# Patient Record
Sex: FEMALE | Race: BLACK OR AFRICAN AMERICAN | NOT HISPANIC OR LATINO | Employment: FULL TIME | URBAN - METROPOLITAN AREA
[De-identification: names, ages, dates, MRNs, and addresses within clinical notes are randomized per-mention and may not be internally consistent; named-entity substitution may affect disease eponyms.]

---

## 2018-01-17 ENCOUNTER — TRANSCRIBE ORDERS (OUTPATIENT)
Dept: PHYSICAL THERAPY | Facility: CLINIC | Age: 48
End: 2018-01-17

## 2018-01-17 DIAGNOSIS — S46.911A STRAIN OF RIGHT SHOULDER, INITIAL ENCOUNTER: Primary | ICD-10-CM

## 2018-01-22 ENCOUNTER — TREATMENT (OUTPATIENT)
Dept: PHYSICAL THERAPY | Facility: CLINIC | Age: 48
End: 2018-01-22

## 2018-01-22 DIAGNOSIS — M25.511 ACUTE PAIN OF RIGHT SHOULDER: Primary | ICD-10-CM

## 2018-01-22 DIAGNOSIS — S46.911A STRAIN OF RIGHT SHOULDER, INITIAL ENCOUNTER: ICD-10-CM

## 2018-01-22 DIAGNOSIS — S46.211A BICEPS STRAIN, RIGHT, INITIAL ENCOUNTER: ICD-10-CM

## 2018-01-22 PROCEDURE — 97001 PR PHYS THERAPY EVALUATION: CPT | Performed by: PHYSICAL THERAPIST

## 2018-01-22 PROCEDURE — 97530 THERAPEUTIC ACTIVITIES: CPT | Performed by: PHYSICAL THERAPIST

## 2018-01-22 PROCEDURE — 97110 THERAPEUTIC EXERCISES: CPT | Performed by: PHYSICAL THERAPIST

## 2018-01-22 PROCEDURE — 97014 ELECTRIC STIMULATION THERAPY: CPT | Performed by: PHYSICAL THERAPIST

## 2018-01-22 NOTE — PROGRESS NOTES
"Physical Therapy Initial Evaluation and Plan of Care    TIME IN 1433 TIME OUT 1553  Patient: Chastity Devine   : 1970  Diagnosis/ICD-10 Code:  Acute pain of right shoulder [M25.511]  Referring practitioner: ANDRES Goode    Subjective Evaluation    History of Present Illness  Date of onset: 2017  Mechanism of injury: Moving large tub/container with metal guard around it; with specific movement patient felt and heard sound of paper ripping in (R) shoulder.  Reported injury.  Worked x 2 days and approx 2 days later onset of pain (R) anterior shoulder. Pain worsened to the point she could not tolerate it and patient presented to [facility].  Given 800 mg Ibuprofen and put on work restrictions (#15-25) but patient was able to work normal job. F/u 17 given steroid pack and Flexeril; has 2 pills left of steroid pack.  Restrictions same and patient has continued working.    Denies prior (R) shoulder injury. Sleeping ok and denies numbness or tingling.      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[employer];    Precautions and Work Restrictions: see scanned restriction sheetPain  Current pain ratin  At best pain ratin  At worst pain rating: 10  Location: (R) anterior shoulder  Quality: tight and throbbing (\"heavy\")  Relieving factors: medications, heat and ice (hot shower)  Aggravating factors: outstretched reach, lifting, movement and overhead activity (dressing activities (shirt and bra on/off), picking up grandbaby, certain specific positions/movements, reaching behind back (pulling), weightbearing through (R) UE - sometimes, cooking activities)  Progression: improved (\"it feels better than it did\")    Hand dominance: right    Diagnostic Tests  No diagnostic tests performed    Treatments  Current treatment: medication and physical therapy  Current treatment comments: work activity restrictions/modifications.     Patient Goals  Patient goals for therapy: decreased pain  Patient goal: " "\"get my arm back to working and get me back to work\"           Objective       Active Range of Motion   Left Shoulder   Flexion: 163 degrees   Extension: 55 degrees   Abduction: 161 degrees   External rotation 90°: 87 degrees   Internal rotation 90°: 62 degrees     Right Shoulder   Flexion: 145 (\"tight, pulling\") degrees   Extension: 35 (\"pull\") degrees   Abduction: 135 (\"tight, pull\") degrees   External rotation 90°: 80 degreeswith pain  Internal rotation 90°: 45 (\"tight\") degrees     Left Elbow   Flexion: 145 degrees   Extension: 0 degrees     Right Elbow   Flexion: 140 degrees   Extension: 0 degrees     Strength/Myotome Testing     Left Shoulder     Planes of Motion   Flexion: 5   Extension: 5   Abduction: 5   External rotation at 0°: 5   Internal rotation at 0°: 5     Right Shoulder     Planes of Motion   Right shoulder forward flexion strength: 5-/5.   Extension: 5   Right shoulder abduction strength: 5-/5.   Right shoulder external rotation strength at 0 degrees: 5-/5.   Internal rotation at 0°: 4+ (mild pain)     Left Elbow   Flexion: 5  Extension: 5    Right Elbow   Flexion strength: 5-/5.  Extension strength: 5-/5.    Tests     Right Shoulder   Positive active compression ([place]), empty can and Speed's.   Negative drop arm, external rotation lag sign and lift-off.          Assessment & Plan     Assessment  Impairments: abnormal or restricted ROM, activity intolerance, impaired physical strength, lacks appropriate home exercise program and pain with function  Assessment details: Patient is a 47 y.o female who reports onset of (R) anterior shoulder pain at work on 12/20/17 when she was moving a large tub/container and felt/heard a \"ripping sound\" (R) anterior shoulder.  She rates pain 1-10/10 and is worse with reaching, lifting, AROM, overhead activities, dressing activities, reaching bhind back, cooking, and picking up her grandbaby.  She exhibits limited (R) shoulder AROM and strength.  Special testing is " positive indicating the possibility of internal derangement.  She may benefit from skilled PT services.  Prognosis: good  Functional Limitations: carrying objects, lifting, pulling, pushing, uncomfortable because of pain, reaching behind back, reaching overhead and unable to perform repetitive tasks  Goals  Plan Goals: STGs: to be met in 2 weeks  1. Patient will be independent with initial HEP  2. Patient will report improved (R) shoulder s/s 0-4/10  3. Patient will demonstrate (R) shoulder AROM WFL all planes, painfree  4. Patient will exhibit negative special testing (R) shoulder  5. Patient will report 50% improved ability to perform ADLs, dressing, and cooking activities    LTGs: to be met in 4 weeks  1. Patient will be independent with progressed strength and stabilization HEP  2. Patient will report improved (R) shoulder s/s 0-1/10  3. Patient will demonstrate improved (R) shoulder MMT grossly 5/5  4. Patient will perform ADLs at near prior ability level, largely painfree  5. Patient will perform simulated work tasks at level sufficient to demonstrate readiness to RTW without restrictions    Plan  Therapy options: will be seen for skilled physical therapy services  Planned modality interventions: cryotherapy, electrical stimulation/Russian stimulation, thermotherapy (hydrocollator packs) and ultrasound  Planned therapy interventions: fine motor coordination training, flexibility, functional ROM exercises, home exercise program, joint mobilization, manual therapy, neuromuscular re-education, motor coordination training, soft tissue mobilization, strengthening, stretching and therapeutic activities  Other planned therapy interventions: simulated work tasks  Frequency: 3x week  Duration in weeks: 4  Treatment plan discussed with: patient        Manual Therapy:         mins  94337;  Therapeutic Exercise:    21     mins  69429;     Neuromuscular Maximus:        mins  93053;    Therapeutic Activity:     15     mins   20553;     Gait Training:           mins  26612;     Ultrasound:          mins  57068;    Electrical Stimulation:    15     mins  17409 ( );  Dry Needling          mins self-pay    Timed Treatment:   36   mins   Total Treatment:     80   mins    PT SIGNATURE: Lydia Harrell PT, DPT   DATE TREATMENT INITIATED: 1/22/2018    Initial Certification  Certification Period: 4/22/2018  I certify that the therapy services are furnished while this patient is under my care.  The services outlined above are required by this patient, and will be reviewed every 90 days.     PHYSICIAN: ANDRES Goode      DATE:     Please sign and return via fax to [phone].. Thank you, [facility].

## 2018-01-26 ENCOUNTER — OFFICE VISIT (OUTPATIENT)
Dept: PHYSICAL THERAPY | Facility: CLINIC | Age: 48
End: 2018-01-26

## 2018-01-26 DIAGNOSIS — M25.511 ACUTE PAIN OF RIGHT SHOULDER: Primary | ICD-10-CM

## 2018-01-26 PROCEDURE — 97110 THERAPEUTIC EXERCISES: CPT | Performed by: PHYSICAL THERAPIST

## 2018-01-26 PROCEDURE — 97014 ELECTRIC STIMULATION THERAPY: CPT | Performed by: PHYSICAL THERAPIST

## 2018-01-26 PROCEDURE — 97530 THERAPEUTIC ACTIVITIES: CPT | Performed by: PHYSICAL THERAPIST

## 2018-01-26 NOTE — PROGRESS NOTES
Physical Therapy Daily Progress Note    Time In 1450  Time Out 1530    Yeedenitamadeline Devine reports: some relief noted last visit post application of ice and electrical stimulation.  Stomach is bothering me today states patient.    Subjective     Objective       Palpation     Right Tenderness of the pectoralis major and pectoralis minor.     Tenderness     Right Shoulder  Tenderness in the biceps tendon (proximal) and bicipital groove.      See Exercise, Manual, and Modality Logs for complete treatment.   Pain free exercise performance and progression.  Added cane assisted AROM abd and ER as well as pain free cervical rot and lat flex B and post sh rolls.      Assessment/Plan  Rehab efforts limited today by subjective reports of stomach pain/distress.  Able to progress AAROM exercises right shoulder without increased symptoms.  Modalities and cryotherapy applications are beneficial per patient report.  Palpable tenderness present along anterior right shoulder(biceps origin/bicepital groove) and pec musculature.    Progress strengthening /stabilization /functional activity as symptoms right shoulder allow           Manual Therapy:         mins  95315;  Therapeutic Exercise:     10    mins  74814;     Neuromuscular Maximus:       mins  13661;    Therapeutic Activity:       10   mins  49996;     Gait Training:           mins  33553;     Ultrasound:          mins  48660;    Electrical Stimulation:    15     mins  41650 ( );      Timed Treatment:   35   mins   Total Treatment:     40   mins    Dani Haro PTA    Physical Therapist Assistant [place] [id number]

## 2018-01-29 ENCOUNTER — TREATMENT (OUTPATIENT)
Dept: PHYSICAL THERAPY | Facility: CLINIC | Age: 48
End: 2018-01-29

## 2018-01-29 DIAGNOSIS — S46.211D BICEPS STRAIN, RIGHT, SUBSEQUENT ENCOUNTER: ICD-10-CM

## 2018-01-29 DIAGNOSIS — M25.511 ACUTE PAIN OF RIGHT SHOULDER: Primary | ICD-10-CM

## 2018-01-29 DIAGNOSIS — S46.911D STRAIN OF RIGHT SHOULDER, SUBSEQUENT ENCOUNTER: ICD-10-CM

## 2018-01-29 PROCEDURE — 97110 THERAPEUTIC EXERCISES: CPT | Performed by: PHYSICAL THERAPIST

## 2018-01-29 PROCEDURE — 97530 THERAPEUTIC ACTIVITIES: CPT | Performed by: PHYSICAL THERAPIST

## 2018-01-29 PROCEDURE — 97014 ELECTRIC STIMULATION THERAPY: CPT | Performed by: PHYSICAL THERAPIST

## 2018-01-29 NOTE — PROGRESS NOTES
"Physical Therapy Daily Progress Note    Time In 1435  Time Out 1526    Chastity Devine reports: \"I'm feeling a little better each time. I'm not in as much pain as last visit or my first visit. I had the CT of my stomach and everything looks good.\"    Subjective     Objective   See Exercise, Manual, and Modality Logs for complete treatment.   *Added submax shld isometrics 5-way and scapular retractions  *Initiated UBE    Assessment & Plan     Assessment  Assessment details: Patient reports steadily improving (R) UE s/s and improving ability to perform and tolerate ADLs.  She is able to initiate UBE forward and retro this date as well as scapular retractions and submax shoulder isometrics without increased pain.  She exhibits improving functional use of (R) UE within PT clinic.  She responds positively to modality application.        Progress per Plan of Care         Manual Therapy:         mins  98153;  Therapeutic Exercise:    22     mins  48259;     Neuromuscular Maximus:        mins  54659;    Therapeutic Activity:     14     mins  00337;     Gait Training:           mins  56701;     Ultrasound:          mins  82741;    Electrical Stimulation:    15     mins  03651 ( );  Dry Needling          mins self-pay    Timed Treatment:   36   mins   Total Treatment:     51   mins    Lydia Harrell PT, DPT  Physical Therapist  [place] License # [id number]    "

## 2018-01-31 ENCOUNTER — TREATMENT (OUTPATIENT)
Dept: PHYSICAL THERAPY | Facility: CLINIC | Age: 48
End: 2018-01-31

## 2018-01-31 DIAGNOSIS — S46.911D STRAIN OF RIGHT SHOULDER, SUBSEQUENT ENCOUNTER: ICD-10-CM

## 2018-01-31 DIAGNOSIS — S46.211D BICEPS STRAIN, RIGHT, SUBSEQUENT ENCOUNTER: ICD-10-CM

## 2018-01-31 DIAGNOSIS — M25.511 ACUTE PAIN OF RIGHT SHOULDER: Primary | ICD-10-CM

## 2018-01-31 PROCEDURE — 97014 ELECTRIC STIMULATION THERAPY: CPT | Performed by: PHYSICAL THERAPIST

## 2018-01-31 PROCEDURE — 97530 THERAPEUTIC ACTIVITIES: CPT | Performed by: PHYSICAL THERAPIST

## 2018-01-31 NOTE — PROGRESS NOTES
"Physical Therapy Progress Note    Time In 1432  Time Out 1521      2018  ANDRES Goode    Re: Chastity Devine  ________________________________________________________________    Ms. Chastity Devine, has attended 4 PT sessions.  Treatment has consisted of: patient education, therapeutic activity, therapeutic exercise, and modalities.    S: Ms. Chastity Devine states: \"My shoulder is feeling mostly better but still does that weird thing that sounds like a joseph doll leg bending/clicking.  It never did that before this injury. I am sleeping ok.  It is uncomfortable reaching behind my back trying to get my bra undone.\"    Subjective Evaluation    Pain  Current pain ratin  At worst pain ratin  Location: (R) anterior shoulder           Objective       Tenderness     Additional Tenderness Details  Min (+) TTP (R) anterior shoulder    Active Range of Motion     Right Shoulder   Flexion: 168 degrees   Extension: 57 degrees   Abduction: 159 (\"click\" within shoulder) degrees   External rotation 90°: 91 degrees  Internal rotation 90°: 59 (\"pulling\") degrees     Right Elbow   Flexion: 142 degrees   Extension: 0 degrees     Strength/Myotome Testing     Right Shoulder     Planes of Motion   Right shoulder forward flexion strength: 5-/5 with mild pain ant shoulder.   Extension: 5   Abduction: 5   Right shoulder external rotation strength at 0 degrees: 5-/5.   Right shoulder internal rotation strength at 0 degrees: 5-/5.     Right Elbow   Flexion: 5  Extension strength: 5-/5.    Tests     Right Shoulder   Positive active compression ([place]) and Speed's.   Negative empty can.      See Exercise, Manual, and Modality Logs for complete treatment.       Assessment & Plan     Assessment  Assessment details: Patient verbalizes significant improvements in (R) shoulder s/s and functional use.  She demonstrates (R) shoulder AROM WFL and mild deficits in flexion, internal, and rotation (flexion with mild pain).  " "Tenderness persists at (R) anterior shoulder, and patient continues to c/o popping/clicking feeling within her (R) shoulder with certain activities and movements but not always predictable patterns.  Special testing is mildly positive in Speeds and Victor's tests.  She reports \"99.9%\" improvement overall; she may benefit from RTW vs one additional week of PT to progress strengthening for decreased s/s of tendonitis.        Other - patient to see Zachary Lundberg PA-C for follow-up appointment after PT this date.         Manual Therapy:         mins  56230;  Therapeutic Exercise:         mins  19430;     Neuromuscular Maximus:        mins  05227;    Therapeutic Activity:     34     mins  17160;     Gait Training:           mins  23999;     Ultrasound:          mins  82907;    Electrical Stimulation:    15     mins  79355 ( );  Dry Needling          mins self-pay    Timed Treatment:   34   mins   Total Treatment:     49   mins    Lydia Harrell PT, DPT  Physical Therapist  [place] License # [id number]  "

## 2025-03-17 ENCOUNTER — OFFICE VISIT (OUTPATIENT)
Dept: SPORTS MEDICINE | Facility: CLINIC | Age: 55
End: 2025-03-17
Payer: OTHER MISCELLANEOUS

## 2025-03-17 VITALS
BODY MASS INDEX: 28.89 KG/M2 | WEIGHT: 157 LBS | RESPIRATION RATE: 20 BRPM | HEIGHT: 62 IN | HEART RATE: 95 BPM | TEMPERATURE: 97.9 F | OXYGEN SATURATION: 97 %

## 2025-03-17 DIAGNOSIS — S46.211A BICEPS MUSCLE STRAIN, RIGHT, INITIAL ENCOUNTER: ICD-10-CM

## 2025-03-17 DIAGNOSIS — M25.511 ACUTE PAIN OF RIGHT SHOULDER: Primary | ICD-10-CM

## 2025-03-17 DIAGNOSIS — R29.898 WEAKNESS OF RIGHT SHOULDER: ICD-10-CM

## 2025-03-17 PROCEDURE — 99243 OFF/OP CNSLTJ NEW/EST LOW 30: CPT | Performed by: STUDENT IN AN ORGANIZED HEALTH CARE EDUCATION/TRAINING PROGRAM

## 2025-03-17 RX ORDER — OLOPATADINE HYDROCHLORIDE 1 MG/ML
1 SOLUTION/ DROPS OPHTHALMIC
COMMUNITY
Start: 2025-03-16 | End: 2026-03-16

## 2025-03-17 RX ORDER — VERAPAMIL HCL 100 %
POWDER (GRAM) MISCELLANEOUS
COMMUNITY
Start: 2024-12-06

## 2025-03-17 RX ORDER — METHOCARBAMOL 750 MG/1
TABLET, FILM COATED ORAL
COMMUNITY
Start: 2025-03-03 | End: 2025-03-31 | Stop reason: ALTCHOICE

## 2025-03-17 RX ORDER — CYCLOBENZAPRINE HCL 10 MG
TABLET ORAL
COMMUNITY
Start: 2025-03-03 | End: 2025-03-31 | Stop reason: ALTCHOICE

## 2025-03-17 RX ORDER — AMLODIPINE BESYLATE 5 MG/1
TABLET ORAL
COMMUNITY
Start: 2025-01-15

## 2025-03-17 RX ORDER — TRIAMTERENE AND HYDROCHLOROTHIAZIDE 37.5; 25 MG/1; MG/1
CAPSULE ORAL
COMMUNITY
Start: 2025-01-15

## 2025-03-17 RX ORDER — MECLIZINE HYDROCHLORIDE 25 MG/1
TABLET ORAL
COMMUNITY
Start: 2025-01-24

## 2025-03-17 RX ORDER — SUMATRIPTAN SUCCINATE 25 MG/1
TABLET ORAL
COMMUNITY
Start: 2024-10-24

## 2025-03-17 RX ORDER — ERGOCALCIFEROL 1.25 MG/1
CAPSULE, LIQUID FILLED ORAL
COMMUNITY
Start: 2025-01-20

## 2025-03-17 NOTE — PROGRESS NOTES
Chastity is a 55 y.o. year old female here today for consultation requested by JEFF Vogel ([facility])     Chief Complaint   Patient presents with    Right Shoulder - Pain, Initial Evaluation     DOI- 3/3/2025 at work she bent over to  a rag at work, initially seen at [facility]       History of Present Illness  Chastity is a 55 y.o. year old RHD female here today for right shoulder pain that has been present since 3/3/2025 when she bent down at work to  a rag off the floor and had acute onset of pain.   History of Present Illness  The patient is a 54-year-old female who presents for evaluation of right shoulder pain.    She sustained an injury to her right shoulder while at work earlier this month. The incident occurred when she bent over to  a rag from the floor, experiencing a sudden pull in her shoulder. She was unable to lift her arm forward or sideways, nor could she flex her bicep. She sought assistance from her supervisor to remove her sweatshirt, during which she experienced pain. Despite attempts to raise her arm, there was no improvement in her condition. She reports no prior history of shoulder pain. The pain is localized to the anterior aspect of her shoulder and along her biceps. Initially, her pain intensity was severe, rating it above 10/10. Currently, her pain is rated as 1/10 at rest and increases with movement. She reports no numbness or tingling sensations. She has been using a sling for support but reports that it exacerbates her pain. Her sleep is generally unaffected, with only one instance of being awakened due to pain. She was prescribed a course of steroids, which she completed and found beneficial. She has been managing her pain with Tylenol and ibuprofen as needed. She was also prescribed methocarbamol by [facility] which she takes as needed. She received a Toradol injection from [facility]. She has been off work since the incident and does not engage in any  "strenuous activities outside of work. Her job typically involves driving a forklift, although she does not frequently lift heavy objects.     MEDICATIONS  Current: Tylenol, ibuprofen, methocarbamol, cyclobenzaprine  Past: Toradol IM      The following data was reviewed by: Shanna Johnson DO on 03/17/2025:  Data reviewed : [facility] note from 3/3/25        Pulse 95   Temp 97.9 °F (36.6 °C)   Resp 20   Ht 157.5 cm (62\")   Wt 71.2 kg (157 lb)   SpO2 97%   BMI 28.72 kg/m²        Physical Exam  Vital signs reviewed.   General: Well developed, well nourished; No acute distress.  Eyes: conjunctiva clear; pupils equally round and reactive  ENT: external ears and nose atraumatic; hearing normal  CV: no peripheral edema, 2+ distal pulses  Resp: normal respiratory effort, no use of accessory muscles  Skin: normal color and pigmentation; no rashes or wounds; normal turgor  Psych: alert and oriented; mood and affect appropriate; recent and remote memory intact  Neuro: sensation to light touch intact    MSK Exam:  The right shoulder is without obvious signs of acute bony deformity, swelling, erythema or ecchymosis. No visible Kaleb deformity. There is no tenderness along the joint line. There is no tenderness at the AC or SC joint. Also tender at the coracoid process. There is TTP of the LHB within the bicipital groove and along the proximal biceps muscle belly. Tenderness improves distally, but is still present along the distal biceps tendon. Distal biceps tendon feels intact. Active range of motion at the shoulder is limited and painful.  Passive range of motion is full, pain-free, and symmetrical. Full and pain-free elbow ROM. Instability tests are negative with anterior and posterior drawer, and sulcus test. Speeds and Yergason's tests are positive for pain and weakness. Rotator cuff strength in neutral is weaker (4/5) on the right compared to the left. Mild pain with IR BTB. The neck and opposite shoulder are " otherwise normal and stable.        Right Shoulder X-Ray  Indication: Pain  Views: AP Internal and External Rotation  Findings:  No fracture  No bony lesion  Normal soft tissues  Minimal AC and GH arthritis  Sclerosis at the RC attachment  No prior studies were available for comparison.    Assessment and Plan  Diagnoses and all orders for this visit:    1. Acute pain of right shoulder (Primary)  -     Ambulatory Referral to Physical Therapy for Evaluation & Treatment    2. Biceps muscle strain, right, initial encounter  -     Ambulatory Referral to Physical Therapy for Evaluation & Treatment    3. Weakness of right shoulder  -     Ambulatory Referral to Physical Therapy for Evaluation & Treatment    Chastity is a 55 y.o. year old RHD female here today for right shoulder pain that has been present since 3/3/2025 when she bent down at work to  a rag off the floor and had acute onset of pain. We reviewed images and exam findings.  Assessment & Plan  1. Right shoulder pain.  The patient's symptoms suggest a potential biceps tear, however, there is no significant alteration in muscle structure that would be concerning for a complete tear. Her condition has shown improvement with pain management and increased mobility. The x-ray results do not indicate any alarming findings. I recommended conservative management. The use of a sling is not deemed necessary unless for protective purposes or as needed for comfort. Gentle range of motion exercises, such as pendulum swings and wall walks, are recommended. An order was provided for physical therapy to work on range of motion and strengthening. The application of heat prior to exercise may be beneficial. She is permitted to continue with muscle relaxers, ibuprofen, and/or Tylenol as needed, but should avoid taking both muscle relaxers simultaneously. She may continue with activity as tolerated but should avoid painful or overly strenuous activity.  We discussed work  restrictions and an updated note was provided. If there is a perceived loss of progress or inability to regain previous function, an MRI may be considered.    Follow-up  The patient will follow up in 2 weeks.  All of her questions were answered and she is agreeable with the plan.    Dictated utilizing Dragon dictation.  Patient or patient representative verbalized consent for the use of Ambient Listening during the visit with  Shanna Johnson DO for chart documentation. 3/17/2025  10:08 EDT

## 2025-03-17 NOTE — LETTER
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   We will follow-up in 2 weeks and make updated recommendations accordingly.            Sincerely,        Shanna Johsnon DO    CC: No Recipients

## 2025-03-17 NOTE — LETTER
April 10, 2025     JEFF Vogel  [address] [facility]  [address] [place], [place] [address]    Patient: Chastity Devine   YOB: 1970   Date of Visit: 3/17/2025       Dear JEFF Vogel,    Thank you for referring Chastity Devine to me for evaluation. Below is a copy of my consult note.    If you have questions, please do not hesitate to call me. I look forward to following Chastity along with you.         Sincerely,        Shanna Johnson, DO        CC: No Recipients    Chastity is a 55 y.o. year old female here today for consultation requested by JEFF Vogel ([facility])     Chief Complaint   Patient presents with   • Right Shoulder - Pain, Initial Evaluation     DOI- 3/3/2025 at work she bent over to  a rag at work, initially seen at Occupational Med       History of Present Illness  Chastity is a 55 y.o. year old RHD female here today for right shoulder pain that has been present since 3/3/2025 when she bent down at work to  a rag off the floor and had acute onset of pain.   History of Present Illness  The patient is a 54-year-old female who presents for evaluation of right shoulder pain.    She sustained an injury to her right shoulder while at work earlier this month. The incident occurred when she bent over to  a rag from the floor, experiencing a sudden pull in her shoulder. She was unable to lift her arm forward or sideways, nor could she flex her bicep. She sought assistance from her supervisor to remove her sweatshirt, during which she experienced pain. Despite attempts to raise her arm, there was no improvement in her condition. She reports no prior history of shoulder pain. The pain is localized to the anterior aspect of her shoulder and along her biceps. Initially, her pain intensity was severe, rating it above 10/10. Currently, her pain is rated as 1/10 at rest and increases with movement. She reports no numbness or tingling sensations. She has been  "using a sling for support but reports that it exacerbates her pain. Her sleep is generally unaffected, with only one instance of being awakened due to pain. She was prescribed a course of steroids, which she completed and found beneficial. She has been managing her pain with Tylenol and ibuprofen as needed. She was also prescribed methocarbamol by [facility] which she takes as needed. She received a Toradol injection from [facility]. She has been off work since the incident and does not engage in any strenuous activities outside of work. Her job typically involves driving a forklift, although she does not frequently lift heavy objects.     MEDICATIONS  Current: Tylenol, ibuprofen, methocarbamol, cyclobenzaprine  Past: Toradol IM      The following data was reviewed by: Shanna Johnson DO on 03/17/2025:  Data reviewed : Occ Med note from 3/3/25        Pulse 95   Temp 97.9 °F (36.6 °C)   Resp 20   Ht 157.5 cm (62\")   Wt 71.2 kg (157 lb)   SpO2 97%   BMI 28.72 kg/m²        Physical Exam  Vital signs reviewed.   General: Well developed, well nourished; No acute distress.  Eyes: conjunctiva clear; pupils equally round and reactive  ENT: external ears and nose atraumatic; hearing normal  CV: no peripheral edema, 2+ distal pulses  Resp: normal respiratory effort, no use of accessory muscles  Skin: normal color and pigmentation; no rashes or wounds; normal turgor  Psych: alert and oriented; mood and affect appropriate; recent and remote memory intact  Neuro: sensation to light touch intact    MSK Exam:  The right shoulder is without obvious signs of acute bony deformity, swelling, erythema or ecchymosis. No visible Kaleb deformity. There is no tenderness along the joint line. There is no tenderness at the AC or SC joint. Also tender at the coracoid process. There is TTP of the LHB within the bicipital groove and along the proximal biceps muscle belly. Tenderness improves distally, but is still present along the distal " biceps tendon. Distal biceps tendon feels intact. Active range of motion at the shoulder is limited and painful.  Passive range of motion is full, pain-free, and symmetrical. Full and pain-free elbow ROM. Instability tests are negative with anterior and posterior drawer, and sulcus test. Speeds and Yergason's tests are positive for pain and weakness. Rotator cuff strength in neutral is weaker (4/5) on the right compared to the left. Mild pain with IR BTB. The neck and opposite shoulder are otherwise normal and stable.        Right Shoulder X-Ray  Indication: Pain  Views: AP Internal and External Rotation  Findings:  No fracture  No bony lesion  Normal soft tissues  Minimal AC and GH arthritis  Sclerosis at the RC attachment  No prior studies were available for comparison.    Assessment and Plan  Diagnoses and all orders for this visit:    1. Acute pain of right shoulder (Primary)  -     Ambulatory Referral to Physical Therapy for Evaluation & Treatment    2. Biceps muscle strain, right, initial encounter  -     Ambulatory Referral to Physical Therapy for Evaluation & Treatment    3. Weakness of right shoulder  -     Ambulatory Referral to Physical Therapy for Evaluation & Treatment    Chastity is a 55 y.o. year old RHD female here today for right shoulder pain that has been present since 3/3/2025 when she bent down at work to  a rag off the floor and had acute onset of pain. We reviewed images and exam findings.  Assessment & Plan  1. Right shoulder pain.  The patient's symptoms suggest a potential biceps tear, however, there is no significant alteration in muscle structure that would be concerning for a complete tear. Her condition has shown improvement with pain management and increased mobility. The x-ray results do not indicate any alarming findings. I recommended conservative management. The use of a sling is not deemed necessary unless for protective purposes or as needed for comfort. Gentle range of  motion exercises, such as pendulum swings and wall walks, are recommended. An order was provided for physical therapy to work on range of motion and strengthening. The application of heat prior to exercise may be beneficial. She is permitted to continue with muscle relaxers, ibuprofen, and/or Tylenol as needed, but should avoid taking both muscle relaxers simultaneously. She may continue with activity as tolerated but should avoid painful or overly strenuous activity.  We discussed work restrictions and an updated note was provided. If there is a perceived loss of progress or inability to regain previous function, an MRI may be considered.    Follow-up  The patient will follow up in 2 weeks.  All of her questions were answered and she is agreeable with the plan.    Dictated utilizing Dragon dictation.  Patient or patient representative verbalized consent for the use of Ambient Listening during the visit with  Shanna Johnson DO for chart documentation. 3/17/2025  10:08 EDT

## 2025-03-21 ENCOUNTER — PATIENT ROUNDING (BHMG ONLY) (OUTPATIENT)
Dept: SPORTS MEDICINE | Facility: CLINIC | Age: 55
End: 2025-03-21
Payer: OTHER MISCELLANEOUS

## 2025-03-21 NOTE — PROGRESS NOTES
March 21, 2025      A [employer] Message has been sent to the patient for PATIENT ROUNDING with [facility]

## 2025-03-24 ENCOUNTER — TELEPHONE (OUTPATIENT)
Dept: SPORTS MEDICINE | Facility: CLINIC | Age: 55
End: 2025-03-24
Payer: OTHER MISCELLANEOUS

## 2025-03-26 ENCOUNTER — TREATMENT (OUTPATIENT)
Dept: PHYSICAL THERAPY | Facility: CLINIC | Age: 55
End: 2025-03-26
Payer: OTHER MISCELLANEOUS

## 2025-03-26 DIAGNOSIS — S46.911D STRAIN OF RIGHT SHOULDER, SUBSEQUENT ENCOUNTER: ICD-10-CM

## 2025-03-26 DIAGNOSIS — M25.511 ACUTE PAIN OF RIGHT SHOULDER: Primary | ICD-10-CM

## 2025-03-26 PROCEDURE — 97530 THERAPEUTIC ACTIVITIES: CPT | Performed by: PHYSICAL THERAPIST

## 2025-03-26 PROCEDURE — 97161 PT EVAL LOW COMPLEX 20 MIN: CPT | Performed by: PHYSICAL THERAPIST

## 2025-03-26 PROCEDURE — 97110 THERAPEUTIC EXERCISES: CPT | Performed by: PHYSICAL THERAPIST

## 2025-03-26 PROCEDURE — 97014 ELECTRIC STIMULATION THERAPY: CPT | Performed by: PHYSICAL THERAPIST

## 2025-03-26 NOTE — PROGRESS NOTES
"Physical Therapy Initial Evaluation and Plan of Care  [facility]  [address], [address], [place] [address]    Patient: Chastity Devine   : 1970  Diagnosis/ICD-10 Code:  Acute pain of right shoulder [M25.511]  Referring practitioner: Shanna Johnson DO  Date: 3/26/2025    Subjective Evaluation    History of Present Illness  Date of onset: 3/3/2025  Mechanism of injury: Patient bent over to  a rag from the floor to set it on a workstation and felt a sharp pain in the front of her (R) shoulder, down the front of her arm along her biceps, and across the chest. She had difficulty moving her (R) UE without help and was unable to remove her hoodie independently.  She reported the injury to her  at work. She presented to [facility] the same day. She was placed on light duty work restrictions which her employer is unable to accommodate, so she has been off work since the injury. She was prescribed a steroid pack and muscle relaxer.    Patient reports the pain is somewhat improved since her injury but not resolved, and her (R) UE feels weak.  She is minimizing use of her (R) UE but has been performing pendulums and wall walks as prescribed by Dr. Johnson.  She is alternating use of ice and heat as recommended. She is having difficulty with reaching activities, self care tasks (requires assistance with washing her back), and ADLs. \"My biceps muscle feels really tired.\"     PMH notable for HTN.      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  Relieving factors: heat, ice and medications  Aggravating factors: lifting, movement, overhead activity, outstretched reach and " repetitive movement  Progression: improved    Hand dominance: right    Diagnostic Tests  X-ray: normal    Patient Goals  Patient goals for therapy: decreased pain, increased motion, increased strength and return to work             Subjective Questionnaire: QuickDASH: 43.18%    Objective          Tenderness     Right Shoulder  Tenderness in the biceps tendon (proximal) and bicipital groove.     Additional Tenderness Details  Mod (+) TTP (R) LHB tendon, biceps mm belly, distal biceps tendon    Active Range of Motion   Left Shoulder   Flexion: 164 degrees   Extension: 76 degrees   Abduction: 164 degrees   External rotation 90°: 98 degrees   Internal rotation 90°: 77 degrees     Right Shoulder   Flexion: 135 (tightness/pulling posterior shoulder) degrees   Extension: 42 (pulling) degrees   Abduction: 136 (pulling (R) posterior shoulder) degrees   External rotation 90°: 81 (pulling) degrees  Internal rotation 90°: 62 (pulling) degrees     Left Elbow   Flexion: WFL  Extension: WFL    Right Elbow   Flexion: WFL  Extension: WFL    Strength/Myotome Testing     Left Shoulder     Planes of Motion   Flexion: 5   Extension: 5   Abduction: 4+   External rotation at 0°: 4+   Internal rotation at 0°: 5     Right Shoulder     Planes of Motion   Flexion: 4   Extension: 4   Abduction: 4-   External rotation at 0°: 4   Internal rotation at 0°: 4     Left Elbow   Flexion: 5  Extension: 4+    Right Elbow   Flexion: 4  Extension: 4          Assessment & Plan       Assessment  Impairments: abnormal or restricted ROM, activity intolerance, impaired physical strength, lacks appropriate home exercise program and pain with function   Functional limitations: carrying objects, lifting, pulling, pushing, uncomfortable because of pain, reaching behind back, reaching overhead and unable to perform repetitive tasks   Assessment details: Patient is a 55 y.o female who reports onset of (R) shoulder and upper arm pain at work on 03/03/2025 when she  picked up a rag from the ground and set it on a workstation.  She has been treated conservatively thus far by occupational medicine and orthopedic MD including work restrictions (currently off work), steroid pack, muscle relaxer, and stretching exercises.  She reports improving (R) shoulder symptoms rated 1-2/10 (down from 10/10 initially), worst with reaching, lifting, carrying, pulling, and self care tasks.  She exhibits tenderness of (R) anterior shoulder and biceps mm, decreased (R) shoulder AROM, and decreased (R) UE strength.  Her QuickDASH score is 43.18% indicating a moderate perceived level of functional limitation.  She will benefit from skilled PT services to address these deficits and assist patient in painfree return to all ADLs and regular work tasks.   Prognosis: good    Goals  Plan Goals: STGs: to be met in 4 weeks  1. Patient will be independent with initial HEP  2. Patient will report 50% improvement in (R) shoulder symptoms for improved use of dominant (R) UE for ADLs  3. Patient will demonstrate improved (R) shoulder AROM all planes equal to (L) for improved reaching ability  4. Patient will exhibit resolution of (R) shoulder/upper arm tenderness as evidence of resolving inflammation    LTGs: to be met in 8 weeks  1. Patient will be independent with progressed HEP  2. Patient will report resolution of (R) UE symptoms for normal functional use of (R) UE  3. Patient will demonstrate improved (R) shoulder and upper arm strength >/= 4+/5 all planes for improved functional use of dominant UE  4. Patient will successfully RTW without restrictions    Plan  Therapy options: will be seen for skilled therapy services  Planned modality interventions: cryotherapy, thermotherapy (hydrocollator packs) and electrical stimulation/Russian stimulation  Planned therapy interventions: ADL retraining, flexibility, functional ROM exercises, home exercise program, joint mobilization, manual therapy, neuromuscular  re-education, soft tissue mobilization, strengthening, stretching and therapeutic activities  Other planned therapy interventions: simulated work tasks  Frequency: 3x week  Duration in weeks: 12  Treatment plan discussed with: patient        TIMED:  Manual Therapy:         mins  40737;  Therapeutic Exercise:    18     mins  11660;     Neuromuscular Maximus:        mins  78596;    Therapeutic Activity:     8     mins  14452;     Gait Training:           mins  96396;     Ultrasound:          mins  31080;    Work Conditioning             mins 17138    UNTIMED:  Electrical Stimulation:    15     mins  78023 ( );  Dry Needling          mins 20561    Timed Treatment:   26   mins   Total Treatment:     61   mins    PT SIGNATURE: Lydia Cornell PT, DPT, OCS  Electronically signed by: Lydia Cornell PT, 03/26/25, 8:21 AM EDT  [place] License #[id number]     DATE TREATMENT INITIATED: 3/26/2025    Initial Certification  Certification Period: 3/26/2025 thru 6/23/2025  I certify that the therapy services are furnished while this patient is under my care.  The services outlined above are required by this patient, and will be reviewed every 90 days.     PHYSICIAN: Shanna Johnson DO  [phone]                                          DATE:     Please sign and return via fax to [phone]. Thank you, [facility].

## 2025-03-28 ENCOUNTER — TREATMENT (OUTPATIENT)
Dept: PHYSICAL THERAPY | Facility: CLINIC | Age: 55
End: 2025-03-28
Payer: OTHER MISCELLANEOUS

## 2025-03-28 DIAGNOSIS — S46.911D STRAIN OF RIGHT SHOULDER, SUBSEQUENT ENCOUNTER: ICD-10-CM

## 2025-03-28 DIAGNOSIS — M25.511 ACUTE PAIN OF RIGHT SHOULDER: Primary | ICD-10-CM

## 2025-03-28 PROCEDURE — 97530 THERAPEUTIC ACTIVITIES: CPT | Performed by: PHYSICAL THERAPIST

## 2025-03-28 PROCEDURE — 97014 ELECTRIC STIMULATION THERAPY: CPT | Performed by: PHYSICAL THERAPIST

## 2025-03-28 PROCEDURE — 97110 THERAPEUTIC EXERCISES: CPT | Performed by: PHYSICAL THERAPIST

## 2025-03-28 NOTE — PROGRESS NOTES
Physical Therapy Daily Treatment Note               [address]                                                                                                                                             [place] [address]    Patient: Chastity Devine   : 1970  Referring practitioner: Shanna Johnson DO  Date of Initial Visit: Type: THERAPY  Noted: 3/26/2025  Today's Date: 3/28/2025  Patient seen for 2 sessions       Visit Diagnoses:    ICD-10-CM ICD-9-CM   1. Acute pain of right shoulder  M25.511 719.41   2. Strain of right shoulder, subsequent encounter  S46.911D V58.89     840.9       Subjective Evaluation    History of Present Illness    Subjective comment: Pt reports that her (R) shoulder was thrubbing yesterday (6/10).  Pain today is only 2/10.       Objective   See Exercise, Manual, and Modality Logs for complete treatment.   Added AAROM shoulder flexion/ER and submax shlder Iso    Assessment & Plan       Assessment  Assessment details: Pt completed treatment with minimal c/o pain in (R) shoulder.  Pt tolerated the addition of shoulder isometrics with no c/o increased  pain. Pt's HEP was updated and given to her.  Pt responds well to moist heat and e stim s/p treatment.  Plan to progress per POC.          Timed:         Manual Therapy:    0     mins  04590;     Therapeutic Exercise:    25     mins  93999;     Neuromuscular Maximus:    0    mins  44830;    Therapeutic Activity:     13     mins  17235;     Gait Trainin     mins  51842;     Ultrasound:     0     mins  51697;    Work Mcadams/Cond      0    mins   72630    Untimed:  E Stim                            15    mins   63929( [id number])    Timed Treatment:   38   mins   Total Treatment:     53   mins    Alex Franco PTA  [place] License: [id number]

## 2025-03-31 ENCOUNTER — OFFICE VISIT (OUTPATIENT)
Dept: SPORTS MEDICINE | Facility: CLINIC | Age: 55
End: 2025-03-31
Payer: OTHER MISCELLANEOUS

## 2025-03-31 VITALS — WEIGHT: 193 LBS | HEIGHT: 62 IN | BODY MASS INDEX: 35.51 KG/M2 | TEMPERATURE: 97.7 F

## 2025-03-31 DIAGNOSIS — R29.898 WEAKNESS OF RIGHT SHOULDER: ICD-10-CM

## 2025-03-31 DIAGNOSIS — M25.511 ACUTE PAIN OF RIGHT SHOULDER: Primary | ICD-10-CM

## 2025-03-31 DIAGNOSIS — S46.211D BICEPS STRAIN, RIGHT, SUBSEQUENT ENCOUNTER: ICD-10-CM

## 2025-03-31 PROCEDURE — 99214 OFFICE O/P EST MOD 30 MIN: CPT | Performed by: STUDENT IN AN ORGANIZED HEALTH CARE EDUCATION/TRAINING PROGRAM

## 2025-03-31 NOTE — LETTER
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   We are awaiting additional test results and will make updated recommendations accordingly, hopefully within the next 2-4 weeks.         Sincerely,        Shanna Johnson DO    CC: No Recipients

## 2025-04-03 ENCOUNTER — TREATMENT (OUTPATIENT)
Dept: PHYSICAL THERAPY | Facility: CLINIC | Age: 55
End: 2025-04-03
Payer: OTHER MISCELLANEOUS

## 2025-04-03 DIAGNOSIS — S46.911D STRAIN OF RIGHT SHOULDER, SUBSEQUENT ENCOUNTER: ICD-10-CM

## 2025-04-03 DIAGNOSIS — M25.511 ACUTE PAIN OF RIGHT SHOULDER: Primary | ICD-10-CM

## 2025-04-03 NOTE — PROGRESS NOTES
"Physical Therapy Daily Treatment Note      [address]  [address]  [facility] [id number] [phone]  Patient: Chastity Devine   : 1970  Referring practitioner: Shanna Johnson DO   Date of Initial Visit: Type: THERAPY  Noted: 3/26/2025  Today's Date: 4/3/2025  Patient seen for 3 sessions          Visit Diagnoses:    ICD-10-CM ICD-9-CM   1. Acute pain of right shoulder  M25.511 719.41   2. Strain of right shoulder, subsequent encounter  S46.911D V58.89     840.9       Subjective   Shoulder is doing a lot better.  No pain this morning.  Main issue at this time is with reaching. Feels like something floating (proximal biceps tendon area).  Awaiting scheduling for MRI.    Objective   See Exercise, Manual, and Modality Logs for complete treatment.   Mod TTP proximal biceps tendon and snapping with passive IR/ER; ~ full AROM R shoulder    Assessment/Plan  Good improvement in mobility so increased focus of exercises to stability. Reported relief with extension isometrics and no c/o pain with others except mild \"pulling\" with supination which subsided with more gentle force. Possible proximal biceps tendon injury.    Continue to progress per POC    Timed:         Manual Therapy:    0     mins  63101;     Therapeutic Exercise:    18     mins  22077;     Neuromuscular Maximus:    10    mins  12380;    Therapeutic Activity:     10     mins  26635;     Gait Trainin     mins  40524;     Ultrasound:     0     mins  56046;    Ionto                               0    mins   25186  Self Care                       0     mins   58774      Un-Timed:  Electrical Stimulation:    0     mins  65488 ( );  Dry Needling     0     mins self-pay  Traction     0     mins 09723  Canalith Repos    0     mins 00132    Timed Treatment:   38   mins   Total Treatment:     48   mins    Aisha Aguirre, PT  [place] License: [id number]                  "

## 2025-04-07 ENCOUNTER — TREATMENT (OUTPATIENT)
Dept: PHYSICAL THERAPY | Facility: CLINIC | Age: 55
End: 2025-04-07
Payer: OTHER MISCELLANEOUS

## 2025-04-07 DIAGNOSIS — S46.911D STRAIN OF RIGHT SHOULDER, SUBSEQUENT ENCOUNTER: ICD-10-CM

## 2025-04-07 DIAGNOSIS — M25.511 ACUTE PAIN OF RIGHT SHOULDER: Primary | ICD-10-CM

## 2025-04-07 PROCEDURE — 97530 THERAPEUTIC ACTIVITIES: CPT | Performed by: PHYSICAL THERAPIST

## 2025-04-07 PROCEDURE — 97110 THERAPEUTIC EXERCISES: CPT | Performed by: PHYSICAL THERAPIST

## 2025-04-07 PROCEDURE — 97112 NEUROMUSCULAR REEDUCATION: CPT | Performed by: PHYSICAL THERAPIST

## 2025-04-07 NOTE — PROGRESS NOTES
Physical Therapy Daily Treatment Note       [facility]           [address], [address], [place] [address]    Patient: Chastity Devine   : 1970  Referring practitioner: Shanna Johnson DO  Date of Initial Visit: Type: THERAPY  Noted: 3/26/2025  Today's Date: 2025  Patient seen for 4 sessions         Visit Diagnoses:     ICD-10-CM ICD-9-CM   1. Acute pain of right shoulder  M25.511 719.41   2. Strain of right shoulder, subsequent encounter  S46.911D V58.89     840.9         Subjective Evaluation    History of Present Illness    Subjective comment: My shoulder is getting better but it still has a little tender spot. Still waiting on phone call to schedule MRI.       Objective   See Exercise, Manual, and Modality Logs for complete treatment.       Assessment & Plan       Assessment  Assessment details: Patient has demonstrated good improvements in (R) shoulder AAROM and PROM.  There remains some limited (R) shoulder AROM with some pain and symptoms and objective findings suggestive of involvement of (R) LHB tendon.  She experiences early onset of muscular fatigue with shoulder stabilization activities as well as delayed activation of (R) shoulder girdle stabilizer muscles.            Progress strengthening /stabilization /functional activity         TIMED:  Manual Therapy:         mins  66451;  Therapeutic Exercise:    28     mins  57585;     Neuromuscular Maximus:    13    mins  77333;    Therapeutic Activity:     10     mins  34543;     Gait Training:           mins  99342;     Ultrasound:          mins  13801;    Work Conditioning             mins 34962    UNTIMED:  Electrical Stimulation:    15     mins  32387 (MC );  Dry Needling          mins 09777    Timed Treatment:   51   mins   Total Treatment:     66   mins    Lydia Cornell PT, DPT, OCS  Physical Therapist  [place] License #[id number]  Electronically signed by: Lydia Cornell PT, 25, 7:02 AM  EDT

## 2025-04-08 ENCOUNTER — TELEPHONE (OUTPATIENT)
Dept: SPORTS MEDICINE | Facility: CLINIC | Age: 55
End: 2025-04-08
Payer: OTHER MISCELLANEOUS

## 2025-04-08 NOTE — TELEPHONE ENCOUNTER
Caller: REFUGIO SORTO    Relationship: WORK [employer]     Best call back number:     What form or medical record are you requesting:  A COPY OF THE MRI ORDER FROM 3/1/25    Who is requesting this form or medical record from you: [employer]     How would you like to receive the form or medical records (pick-up, mail, fax): FAX  If fax, what is the fax number: [phone]    Timeframe paperwork needed: ASAP

## 2025-04-09 ENCOUNTER — TELEPHONE (OUTPATIENT)
Dept: SPORTS MEDICINE | Facility: CLINIC | Age: 55
End: 2025-04-09
Payer: OTHER MISCELLANEOUS

## 2025-04-09 NOTE — TELEPHONE ENCOUNTER
Called patient and let her know that the order was faxed to her adjustor yesterday, per Dania's note. Advised patient that we have to wait for approval from adjustor before we can get her scheduled for MRI.

## 2025-04-09 NOTE — TELEPHONE ENCOUNTER
Hub staff attempted to follow warm transfer process and was unsuccessful     Caller: KOTA KRAMER     Relationship to patient: PATIENT     Best call back number: [phone]    Patient is needing: PATIENT ASK TO SPEAK WITH MIKE, VINCENT MUÑOZ AT PRACTICE INDICATING MIKE IS NOT IN TODAY.      PATIENT CALLING TO CHECK STATUS OF MRI ORDER [employer].  PATIENT SPOKE WITH JODY SORTO 04/08/2025.  PER PREV COMMUNICATIONS, JODY SORTO CALLED , REQUESTED MRI - MRI FAXED 04/09/2025

## 2025-04-10 ENCOUNTER — TREATMENT (OUTPATIENT)
Dept: PHYSICAL THERAPY | Facility: CLINIC | Age: 55
End: 2025-04-10
Payer: OTHER MISCELLANEOUS

## 2025-04-10 DIAGNOSIS — S46.911D STRAIN OF RIGHT SHOULDER, SUBSEQUENT ENCOUNTER: ICD-10-CM

## 2025-04-10 DIAGNOSIS — M25.511 ACUTE PAIN OF RIGHT SHOULDER: Primary | ICD-10-CM

## 2025-04-10 PROCEDURE — 97014 ELECTRIC STIMULATION THERAPY: CPT | Performed by: PHYSICAL THERAPIST

## 2025-04-10 PROCEDURE — 97530 THERAPEUTIC ACTIVITIES: CPT | Performed by: PHYSICAL THERAPIST

## 2025-04-10 PROCEDURE — 97112 NEUROMUSCULAR REEDUCATION: CPT | Performed by: PHYSICAL THERAPIST

## 2025-04-10 PROCEDURE — 97110 THERAPEUTIC EXERCISES: CPT | Performed by: PHYSICAL THERAPIST

## 2025-04-10 NOTE — PROGRESS NOTES
"Chief Complaint   Patient presents with    Right Shoulder - Follow-up, Pain       History of Present Illness  Chastity is a 55 y.o. year old RHD female here today for right shoulder pain that has been present since 3/3/2025 when she bent down at work to  a rag off the floor and had acute onset of pain. Initial history and exam consistent with a biceps injury. I recommended conservative management. Please see previous notes for complete history and treatment course.   History of Present Illness  She has been experiencing persistent discomfort in her right shoulder, which has not improved despite several physical therapy sessions. Pain significantly worsened on 03/27/2025, characterized by a throbbing sensation that escalated to the point of inducing tears. Despite taking ibuprofen and attempting to sleep, the pain remained unrelieved upon awakening. The intensity of the pain is currently rated as a 6 on a scale of 10. On 03/29/2025, she experienced difficulty sleeping due to the pain, managing only 2 hours of rest. She describes the pain as being localized to the shoulder and radiating anteriorly. There is also tenderness reported in the inner aspect of her elbow. She is able to fully extend her arm upwards, although with some tightness. She has been managing the pain with as-needed ibuprofen, alternating hot and cold compresses, and performing exercises recommended by her physical therapist. She also took a dose of cyclobenzaprine 10 mg, which provided some relief but did not completely alleviate the discomfort. No new injuries or complaints.    MEDICATIONS  Current: cyclobenzaprine  Past: Toradol IM, Tylenol, methocarbamol,       The following data was reviewed by: Shanna Johnson DO on 03/17/2025:  Data reviewed : Occ Med note from 3/3/25        Temp 97.7 °F (36.5 °C) (Temporal)   Ht 157.5 cm (62\")   Wt 87.5 kg (193 lb)   LMP  (LMP Unknown)   BMI 35.30 kg/m²        Physical Exam  MSK Exam:  The right " shoulder is without obvious signs of acute bony deformity, swelling, erythema or ecchymosis. No visible Kaleb deformity. There is persistent TTP of the LHB within the bicipital groove and along the proximal biceps muscle belly. Tenderness improves distally and distal biceps tendon is intact. Active range of motion at the shoulder is limited and painful but slightly improved compared to previous.  Passive range of motion is full and symmetrical. Full and pain-free elbow ROM. Speeds and Yergason's tests are positive for pain and weakness. Rotator cuff strength in neutral is weaker (4/5) on the right compared to the left, but strength well maintained. No significant change compared to previous exam.       Assessment and Plan  Diagnoses and all orders for this visit:    1. Acute pain of right shoulder (Primary)  -     MRI Shoulder Right Without Contrast; Future  -     Discontinue: Ibuprofen 3 %, Gabapentin 10 %, Baclofen 2 %, lidocaine 4 %; Apply 1-2 g topically to the appropriate area as directed 3 (Three) to 4 (Four) times daily.  Dispense: 90 g; Refill: 3  -     Ibuprofen 3 %, Gabapentin 10 %, Baclofen 2 %, lidocaine 4 %; Apply 1-2 g topically to the appropriate area as directed 3 (Three) to 4 (Four) times daily.  Dispense: 90 g; Refill: 3    2. Biceps strain, right, subsequent encounter  -     MRI Shoulder Right Without Contrast; Future  -     Discontinue: Ibuprofen 3 %, Gabapentin 10 %, Baclofen 2 %, lidocaine 4 %; Apply 1-2 g topically to the appropriate area as directed 3 (Three) to 4 (Four) times daily.  Dispense: 90 g; Refill: 3  -     Ibuprofen 3 %, Gabapentin 10 %, Baclofen 2 %, lidocaine 4 %; Apply 1-2 g topically to the appropriate area as directed 3 (Three) to 4 (Four) times daily.  Dispense: 90 g; Refill: 3    3. Weakness of right shoulder  -     MRI Shoulder Right Without Contrast; Future  -     Discontinue: Ibuprofen 3 %, Gabapentin 10 %, Baclofen 2 %, lidocaine 4 %; Apply 1-2 g topically to the  appropriate area as directed 3 (Three) to 4 (Four) times daily.  Dispense: 90 g; Refill: 3  -     Ibuprofen 3 %, Gabapentin 10 %, Baclofen 2 %, lidocaine 4 %; Apply 1-2 g topically to the appropriate area as directed 3 (Three) to 4 (Four) times daily.  Dispense: 90 g; Refill: 3    Chastity is a 55 y.o. year old RHD female here today for right shoulder pain that has been present since 3/3/2025 when she bent down at work to  a rag off the floor and had acute onset of pain. Initial history and exam consistent with a biceps injury. I recommended conservative management.   Assessment & Plan  The patient's symptoms and exam findings continue to be consistent with a biceps injury, with possible involvement of the subscapularis. Given the persistence of her significant discomfort and lack of significant progression for nearly a month, I recommend an MRI for further evaluation. She is advised to continue her physical therapy regimen and home exercises. A topical compound medication will be prescribed for application 3 to 4 times daily. She is also advised to take Tylenol in conjunction with ibuprofen or Aleve before bedtime to enhance pain control during the night and hopefully limit sleeplessness. An updated work note will be provided. The results of the MRI will guide the subsequent course of treatment.    We will follow-up after MRI is obtained and make additional treatment recommendations accordingly.   All of her questions were answered and she is agreeable with the plan.    Dictated utilizing Dragon dictation.  Patient or patient representative verbalized consent for the use of Ambient Listening during the visit with  Shanna Johnson DO for chart documentation. 3/31/2025  09:42 EDT

## 2025-04-10 NOTE — PROGRESS NOTES
Physical Therapy Daily Treatment Note       [facility]           [address], [address], [place] [address]    Patient: Chastity Devine   : 1970  Referring practitioner: Shanna Johnson DO  Date of Initial Visit: Type: THERAPY  Noted: 3/26/2025  Today's Date: 4/10/2025  Patient seen for 5 sessions         Visit Diagnoses:     ICD-10-CM ICD-9-CM   1. Acute pain of right shoulder  M25.511 719.41   2. Strain of right shoulder, subsequent encounter  S46.911D V58.89     840.9         Subjective Evaluation    History of Present Illness    Subjective comment: My shoulder feels ok this morning; I'm not having any pain right now.       Objective   See Exercise, Manual, and Modality Logs for complete treatment.       Assessment & Plan       Assessment  Assessment details: Patient remains motivated and compliant with PT interventions.  She exhibits improved tolerance for (R) shoulder ROM and stretching exercises with decreased reported symptoms and improved mobility/ROM arcs.  There is evidence of gradually improving (R) UE functional strength and ability to actively move (R) UE with less pain.  Patient still awaiting approval of MRI and scheduling of such.          Progress strengthening /stabilization /functional activity         TIMED:  Manual Therapy:         mins  80477;  Therapeutic Exercise:    20     mins  17809;     Neuromuscular Maximus:    10    mins  51680;    Therapeutic Activity:     10     mins  90408;     Gait Training:           mins  44050;     Ultrasound:          mins  00004;    Work Conditioning             mins 87508    UNTIMED:  Electrical Stimulation:    15     mins  74888 (MC );  Dry Needling          mins 24192    Timed Treatment:   40   mins   Total Treatment:     55   mins    Lydia Cornell PT, DPT, OCS  Physical Therapist  [place] License #[id number]  Electronically signed by: Lydia Cornell PT, 04/10/25, 6:57 AM EDT

## 2025-04-17 ENCOUNTER — TREATMENT (OUTPATIENT)
Dept: PHYSICAL THERAPY | Facility: CLINIC | Age: 55
End: 2025-04-17
Payer: OTHER MISCELLANEOUS

## 2025-04-17 DIAGNOSIS — M25.511 ACUTE PAIN OF RIGHT SHOULDER: Primary | ICD-10-CM

## 2025-04-17 DIAGNOSIS — S46.911D STRAIN OF RIGHT SHOULDER, SUBSEQUENT ENCOUNTER: ICD-10-CM

## 2025-04-17 PROCEDURE — 97530 THERAPEUTIC ACTIVITIES: CPT | Performed by: PHYSICAL THERAPIST

## 2025-04-17 PROCEDURE — 97112 NEUROMUSCULAR REEDUCATION: CPT | Performed by: PHYSICAL THERAPIST

## 2025-04-17 PROCEDURE — 97110 THERAPEUTIC EXERCISES: CPT | Performed by: PHYSICAL THERAPIST

## 2025-04-17 PROCEDURE — 97014 ELECTRIC STIMULATION THERAPY: CPT | Performed by: PHYSICAL THERAPIST

## 2025-04-17 NOTE — PROGRESS NOTES
Physical Therapy Daily Treatment Note       [facility]           [address], [address], [place] [address]    Patient: Chastity Devine   : 1970  Referring practitioner: Shanna Johnson DO  Date of Initial Visit: Type: THERAPY  Noted: 3/26/2025  Today's Date: 2025  Patient seen for 6 sessions         Visit Diagnoses:     ICD-10-CM ICD-9-CM   1. Acute pain of right shoulder  M25.511 719.41   2. Strain of right shoulder, subsequent encounter  S46.911D V58.89     840.9         Subjective Evaluation    History of Present Illness    Subjective comment: My shoulder is doing a lot better than when I started PT.  I can use my arm to wash myself now.  I am not doing much at home to really stress it though.       Objective   See Exercise, Manual, and Modality Logs for complete treatment.       Assessment & Plan       Assessment  Assessment details: Patient demonstrates good improvements in (R) shoulder symptoms, AAROM, and AROM.  She is better able to reach to dress and bathe herself.  She has not really been stressing her shoulder and testing its true capability due to work restrictions and resting it with her home activities.  There remains some concern for internal derangement due to LORETA and early symptoms with limited function.  She continues to await approval of MRI at this time; no further PT has been approved beyond today's visit.  Will await approval for next steps.           Other - await approval of MRI for (R) shoulder.          TIMED:  Manual Therapy:         mins  67073;  Therapeutic Exercise:    20     mins  94529;     Neuromuscular Maximus:    12    mins  27831;    Therapeutic Activity:     10     mins  48405;     Gait Training:           mins  90833;     Ultrasound:          mins  30572;    Work Conditioning             mins 50384    UNTIMED:  Electrical Stimulation:    15     mins  46566 ( );  Dry Needling          mins 87178    Timed Treatment:   42    mins   Total Treatment:     57   mins    Lydia Cornell PT, DPT, OCS  Physical Therapist  [place] License #[id number]  Electronically signed by: Lydia Cornell PT, 04/17/25, 7:27 AM EDT

## 2025-04-22 ENCOUNTER — TELEPHONE (OUTPATIENT)
Dept: SPORTS MEDICINE | Facility: CLINIC | Age: 55
End: 2025-04-22

## 2025-04-22 NOTE — TELEPHONE ENCOUNTER
Provider: TUYET    Caller: Chastity Devine    Relationship to Patient: Self    Phone Number: [phone]    Reason for Call: PATIENT IS ASKING IF AUTH FOR HER MRI FROM WORK COMP WAS RECEIVED. SHE IS ALSO ASKING WHAT NEXT STEPS WOULD BE IF THE MRI ISN'T APPROVED.

## 2025-04-29 ENCOUNTER — HOSPITAL ENCOUNTER (OUTPATIENT)
Dept: MRI IMAGING | Facility: HOSPITAL | Age: 55
Discharge: HOME | End: 2025-04-29
Admitting: STUDENT IN AN ORGANIZED HEALTH CARE EDUCATION/TRAINING PROGRAM
Payer: OTHER MISCELLANEOUS

## 2025-04-29 DIAGNOSIS — M25.511 ACUTE PAIN OF RIGHT SHOULDER: ICD-10-CM

## 2025-04-29 DIAGNOSIS — R29.898 WEAKNESS OF RIGHT SHOULDER: ICD-10-CM

## 2025-04-29 DIAGNOSIS — S46.211D BICEPS STRAIN, RIGHT, SUBSEQUENT ENCOUNTER: ICD-10-CM

## 2025-04-29 PROCEDURE — 73221 MRI JOINT UPR EXTREM W/O DYE: CPT

## 2025-05-01 ENCOUNTER — TELEPHONE (OUTPATIENT)
Dept: SPORTS MEDICINE | Facility: CLINIC | Age: 55
End: 2025-05-01
Payer: OTHER MISCELLANEOUS

## 2025-05-01 NOTE — TELEPHONE ENCOUNTER
Pt calling to get clarification of MRI results. Stated she looked on MYChart but doesn't understand the medical information. Please call pt  to advise

## 2025-05-08 ENCOUNTER — OFFICE VISIT (OUTPATIENT)
Dept: SPORTS MEDICINE | Facility: CLINIC | Age: 55
End: 2025-05-08

## 2025-05-08 VITALS — WEIGHT: 197 LBS | BODY MASS INDEX: 36.25 KG/M2 | RESPIRATION RATE: 18 BRPM | HEIGHT: 62 IN

## 2025-05-08 DIAGNOSIS — M75.51 SUBACROMIAL BURSITIS OF RIGHT SHOULDER JOINT: ICD-10-CM

## 2025-05-08 DIAGNOSIS — M25.511 ACUTE PAIN OF RIGHT SHOULDER: Primary | ICD-10-CM

## 2025-05-08 DIAGNOSIS — M67.911 TENDINOPATHY OF RIGHT ROTATOR CUFF: ICD-10-CM

## 2025-05-08 RX ORDER — LINACLOTIDE 72 UG/1
72 CAPSULE, GELATIN COATED ORAL DAILY
COMMUNITY
Start: 2025-04-07

## 2025-05-08 NOTE — PROGRESS NOTES
Chief Complaint   Patient presents with    Right Shoulder - Follow-up     Follow up right shoulder. Pt reports no changes.       History of Present Illness  Chastity is a 55 y.o. year old RHD female here today for follow-up of right shoulder pain that has been present since 3/3/2025 when she bent down at work to  a rag off the floor and had acute onset of pain. Initial history and exam consistent with a biceps injury. I recommended conservative management. She returned with persistent symptoms without significant improvement, so decision was made to proceed with MRI for further evaluation.  Please see previous notes for complete history and treatment course.   History of Present Illness  She reports a general improvement in her condition since the last consultation. She has been cautious not to overexert herself, avoiding activities that may strain her shoulder. She is able to perform certain tasks such as lifting a container of orange juice or a case of water without difficulty but experiences discomfort when opening jars. She describes a persistent, floating pain localized to the right side of her shoulder, which occasionally necessitates adjusting her bra strap for relief. She acknowledges the need for time to heal and has completed her physical therapy sessions. She continues to perform the prescribed exercises independently at home.    She is currently on leave from work and is awaiting medical clearance to return. 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 She works Monday through Friday, 8 hours per day, but anticipates changes in her schedule around June or July.    SOCIAL HISTORY  Occupations: Currently off work, awaiting release to return to work.  Exercise: Performs exercises at home as part of physical therapy routine.      MEDICATIONS  Current: cyclobenzaprine  Past: Toradol IM, Tylenol, methocarbamol      The following  data was reviewed by: Shanna Johnson DO:  Data reviewed :       Results for orders placed during the hospital encounter of 04/29/25    MRI Shoulder Right Without Contrast    Narrative  MRI SHOULDER RIGHT WO CONTRAST-    Date of Exam: 4/29/2025 5:02 PM    Indication: Persistent acute right shoulder pain with concern for high  grade proximal biceps tear with possible rotator cuff involvement.    Comparison: Radiographs 3/17/2025.    Technique: Multiplanar, multisequence MR imaging of the shoulder was  performed without contrast.    FINDINGS:    Rotator cuff: Mild supraspinatus and anterior infraspinatus tendinopathy  with mild partial-thickness bursal sided fraying proximal to the  footprint. The subscapularis and teres minor tendons are intact.  Moderate (grade 2/4) fatty atrophy of the teres minor muscle, suggesting  sequela chronic quadrilateral space syndrome. No myositis.    Biceps tendon: The intra-articular long head biceps tendon is normal in  signal, morphology, and position in the bicipital groove.    Acromioclavicular Arch: Mild age-appropriate hypertrophic degenerative  changes at the acromioclavicular joint with associated mild mass effect  on the supraspinatus space. Type II acromion with a well-corticated  ossification of the anterior tip of the acromion suggesting preacromial  type os acromiale. Small anterior/inferior acromial enthesophyte with  associated mild mass effect on the supraspinatus space. Mild abnormal  fluid in the subacromial/subdeltoid bursa.    Glenohumeral joint: The humeral head and glenoid are congruent. No  significant joint effusion. No discrete intra-articular body. The  articular cartilage is intact. The joint capsule is within normal  limits.    Labrum: No evidence of acute labral tear. No paralabral cyst.    Bone: Tiny degenerative cystic changes in the distal clavicle and  acromion. Nonspecific tiny subcortical cystic changes in the posterior  humeral head. No fracture. No  "concerning bone marrow lesion or marrow  replacing process.    Soft tissues: No soft tissue mass.    Impression  1. Mild supraspinatus and anterior infraspinatus tendinopathy with mild  partial-thickness bursal sided fraying proximal to the footprint.  2. Mild age-appropriate DJD at the AC joint, preacromial os acromiale,  and small anterior/inferior acromial enthesophyte with mild  subacromial/subdeltoid bursitis.  3. The intra-articular long head biceps tendon is intact.  4. Moderate fatty atrophy of the teres minor muscle, likely reflecting  sequela of chronic quadrilateral space syndrome.    This report was finalized on 4/30/2025 8:54 AM by Liam Chung MD on  Workstation: [id number]     Resp 18   Ht 157.5 cm (62\")   Wt 89.4 kg (197 lb)   LMP  (LMP Unknown)   BMI 36.03 kg/m²        Physical Exam  MSK Exam:  The right shoulder is without obvious signs of acute bony deformity, swelling, erythema or ecchymosis. There is persistent but improved TTP of the LHB within the bicipital groove. No significant tenderness of the biceps as previously noted. Active range of motion at the shoulder is full and symmetrical. Impingement signs are mildly positive. Speeds and Yergason's tests are positive for weakness, no significant pain. Rotator cuff strength in neutral is 4/5, but strength is near symmetrical and without significant pain. Pain and weakness with Empty Can.  No significant change compared to previous exam.       Assessment and Plan  Diagnoses and all orders for this visit:    1. Acute pain of right shoulder (Primary)    2. Tendinopathy of right rotator cuff    3. Subacromial bursitis of right shoulder joint      Chastity is a 55 y.o. year old RHD female here today for follow-up of right shoulder pain that has been present since 3/3/2025 when she bent down at work to  a rag off the floor and had acute onset of pain. Initial history and exam consistent with a biceps injury. I recommended conservative " management. She returned with persistent symptoms without significant improvement, so decision was made to proceed with MRI for further evaluation. MRI showed mild supraspinatus and infraspinatus tendinopathy with mild partial thickness fraying, mild AC DJD, mild subacromial bursitis, and moderate fatty atrophy of the teres minor.   Assessment & Plan  Her condition has shown significant improvement. Physical therapy has been beneficial in enhancing pain management, range of motion, and strength. Although there is a slight weakness, it is fairly symmetrical on both sides. Capable of performing all necessary tasks. The potential for an injection to alleviate inflammation from arthritis or muscle irritation was discussed, but given satisfactory examination results, this option was deferred. Advised to gradually increase activity level, reassured by the absence of concerning MRI findings. Encouraged to continue physical therapy exercises bilaterally. A return to work on 05/12/2025 was recommended, with the understanding that any difficulties encountered will be communicated.     Follow-up  Follow up in 3 weeks.  All of her questions were answered and she is agreeable with the plan.    Dictated utilizing Dragon dictation.  Patient or patient representative verbalized consent for the use of Ambient Listening during the visit with  Shanna Johnson DO for chart documentation. 5/8/2025  08:40 EDT   wheelchair

## 2025-05-08 NOTE — LETTER
May 8, 2025     Patient: Chastity Devine   YOB: 1970   Date of Visit: 5/8/2025       To Whom It May Concern:    It is my medical opinion that Chastity Devine may return to full duty with no restrictions on 5/12/25. We will plan on following-up in another 3 weeks to ensure that she has been able to return to full baseline activity without return of symptoms.           Sincerely,        Shanna Johnson,     CC: No Recipients

## 2025-05-30 ENCOUNTER — OFFICE VISIT (OUTPATIENT)
Dept: SPORTS MEDICINE | Facility: CLINIC | Age: 55
End: 2025-05-30
Payer: OTHER MISCELLANEOUS

## 2025-05-30 VITALS — HEIGHT: 63 IN | BODY MASS INDEX: 35.44 KG/M2 | WEIGHT: 200 LBS | RESPIRATION RATE: 16 BRPM

## 2025-05-30 DIAGNOSIS — M67.911 TENDINOPATHY OF RIGHT ROTATOR CUFF: ICD-10-CM

## 2025-05-30 DIAGNOSIS — M25.511 ACUTE PAIN OF RIGHT SHOULDER: Primary | ICD-10-CM

## 2025-05-30 DIAGNOSIS — M75.51 SUBACROMIAL BURSITIS OF RIGHT SHOULDER JOINT: ICD-10-CM

## 2025-05-30 DIAGNOSIS — M67.40 GANGLION CYST: ICD-10-CM

## 2025-05-30 PROCEDURE — 99213 OFFICE O/P EST LOW 20 MIN: CPT | Performed by: STUDENT IN AN ORGANIZED HEALTH CARE EDUCATION/TRAINING PROGRAM

## 2025-07-13 NOTE — PROGRESS NOTES
"Chief Complaint   Patient presents with    Right Shoulder - Follow-up     Follow up for shoulder pain. Pain and throbbing and pain has bothered her since last visit.        History of Present Illness  Chastity is a 55 y.o. year old RHD female here today for follow-up of right shoulder pain that has been present since 3/3/2025 when she bent down at work to  a rag off the floor and had acute onset of pain. Initial history and exam consistent with a biceps injury. I recommended conservative management. She returned with persistent symptoms without significant improvement, so decision was made to proceed with MRI for further evaluation. MRI showed mild supraspinatus and infraspinatus tendinopathy with mild partial thickness fraying, mild AC DJD, mild subacromial bursitis, and moderate fatty atrophy of the teres minor. She showed improvement with PT. Please see previous notes for complete history and treatment course.   History of Present Illness  She reports overall improvement with no consistent pain, but does report experiencing severe throbbing pain in her shoulder on three separate occasions since her last visit. She managed those episodes with Tylenol, ibuprofen, and ice application. Additionally, she mentions a floating bump in her shoulder, which she finds particularly painful. Despite these symptoms, she has resumed her full duties at work, although she is cautious not to overexert herself. At home, she has been able to perform household chores such as lifting small water cases, assisting with groceries, washing dishes, and doing laundry. However, she has not yet attempted any outdoor work. She reports no numbness or tingling sensations. She has been performing exercises at home as part of her physical therapy regimen. No new injuries or complaints.    SOCIAL HISTORY  Occupations: Works with [employer]  Exercise: Performs exercises at home      Resp 16   Ht 160 cm (63\")   Wt 90.7 kg (200 lb)   " LMP  (LMP Unknown)   BMI 35.43 kg/m²        Physical Exam  MSK Exam:  The right shoulder is without obvious signs of acute bony deformity, swelling, erythema or ecchymosis.  There are 2 small, well-rounded, rubbery masses directly adjacent to each other on the superior aspect of the shoulder near the acromion.  No significant tenderness of the LHB as previously noted. Active range of motion at the shoulder is full and symmetrical. Impingement signs are very mildly positive. Speeds and Yergason's tests are positive for weakness, no significant pain. Rotator cuff strength in neutral is 4+/5, but strength is near symmetrical and without significant pain. Weakness with Empty Can.  No significant change compared to previous exam.         Assessment and Plan  Diagnoses and all orders for this visit:    1. Acute pain of right shoulder (Primary)    2. Tendinopathy of right rotator cuff    3. Subacromial bursitis of right shoulder joint    4. Ganglion cyst      Chastity is a 55 y.o. year old RHD female here today for follow-up of right shoulder pain that has been present since 3/3/2025 when she bent down at work to  a rag off the floor and had acute onset of pain. Initial history and exam consistent with a biceps injury. I recommended conservative management. She returned with persistent symptoms without significant improvement, so decision was made to proceed with MRI for further evaluation. MRI showed mild supraspinatus and infraspinatus tendinopathy with mild partial thickness fraying, mild AC DJD, mild subacromial bursitis, and moderate fatty atrophy of the teres minor. She showed improvement with PT.  Assessment & Plan  She has done will with conservative management. Has been able to return to activity without significant issues. Continue home exercises to maintain strength and use a topical cream for symptom relief. Monitor the cyst for any changes in size or pain intensity. If the cyst enlarges or becomes  more painful, inform the clinic for further evaluation and potential drainage. Consider wearing a sports bra or a bra with wider straps to alleviate potential irritation from the bra strap.  Follow-up as needed.  All of her questions were answered and she is agreeable with the plan.    Dictated utilizing Dragon dictation.  Patient or patient representative verbalized consent for the use of Ambient Listening during the visit with  Shanna Johnson DO for chart documentation. 5/30/2025  15:48 EDT